# Patient Record
Sex: MALE | Race: WHITE | Employment: FULL TIME | URBAN - METROPOLITAN AREA
[De-identification: names, ages, dates, MRNs, and addresses within clinical notes are randomized per-mention and may not be internally consistent; named-entity substitution may affect disease eponyms.]

---

## 2020-08-08 ENCOUNTER — APPOINTMENT (OUTPATIENT)
Dept: CT IMAGING | Age: 40
End: 2020-08-08
Attending: PHYSICIAN ASSISTANT
Payer: OTHER MISCELLANEOUS

## 2020-08-08 ENCOUNTER — HOSPITAL ENCOUNTER (OUTPATIENT)
Age: 40
Discharge: HOME | End: 2020-08-08
Payer: OTHER MISCELLANEOUS

## 2020-08-08 VITALS
RESPIRATION RATE: 16 BRPM | WEIGHT: 170 LBS | SYSTOLIC BLOOD PRESSURE: 112 MMHG | DIASTOLIC BLOOD PRESSURE: 76 MMHG | OXYGEN SATURATION: 98 % | TEMPERATURE: 98 F | HEART RATE: 65 BPM

## 2020-08-08 DIAGNOSIS — L56.8 PHOTOSENSITIVITY: ICD-10-CM

## 2020-08-08 DIAGNOSIS — S09.90XA CLOSED HEAD INJURY, INITIAL ENCOUNTER: Primary | ICD-10-CM

## 2020-08-08 PROCEDURE — 70450 CT HEAD/BRAIN W/O DYE: CPT | Performed by: PHYSICIAN ASSISTANT

## 2020-08-08 PROCEDURE — 99203 OFFICE O/P NEW LOW 30 MIN: CPT | Performed by: PHYSICIAN ASSISTANT

## 2020-08-08 NOTE — ED PROVIDER NOTES
Patient sustained a head injury last night at work. He was struck in the head by a rack of glasses. No loss of consciousness but he has postconcussive symptoms including significant headache and dizziness.     CT scan brain  CONCLUSION:  No acute intracra

## 2020-08-08 NOTE — ED PROVIDER NOTES
Patient Seen in: [id number] [facility] [place]      History   Patient presents with:  Head Neck Injury    Stated Complaint: WC- head injury, headache, numbness to back of head, lethargic    HPI    49-year-old male who comes in today who is a  abnormality  Eyes:  PERRL, EOM's intact, conjunctiva and cornea clear, normal fundoscopic exam   Ears:  TM pearly gray color, external ear canals normal, both ears, no mastoid tenderness bilaterally   Nose:  Nares symmetrical, minimal watery discharge; no Marisol Rose MD  [address] [address]  [phone]    Schedule an appointment as soon as possible for a visit             Medications Prescribed:  There are no discharge medications for this patient.       I have given the patient

## 2020-08-08 NOTE — ED INITIAL ASSESSMENT (HPI)
States he was moving a glass rack last night at work, states the glasses  came out hitting head. Denies syncopal episode. Having a h/a now which has improved since last night. Slightly dizzy. Denies nausea or vomiting.

## (undated) NOTE — LETTER
Date & Time: 8/8/2020, 10:14 AM  Patient: Syed Economy  Encounter Provider(s):    Bernie Wilcox PA-C       To Whom It May Concern:    Jeri Holm was seen and treated in our department on 8/8/2020. He should not return to work until 8/11/20.     I